# Patient Record
Sex: FEMALE
[De-identification: names, ages, dates, MRNs, and addresses within clinical notes are randomized per-mention and may not be internally consistent; named-entity substitution may affect disease eponyms.]

---

## 2019-08-15 PROBLEM — Z00.00 ENCOUNTER FOR PREVENTIVE HEALTH EXAMINATION: Status: ACTIVE | Noted: 2019-08-15

## 2019-09-11 ENCOUNTER — APPOINTMENT (OUTPATIENT)
Dept: ORTHOPEDIC SURGERY | Facility: CLINIC | Age: 54
End: 2019-09-11
Payer: COMMERCIAL

## 2019-09-11 VITALS — BODY MASS INDEX: 38.57 KG/M2 | HEIGHT: 66 IN | WEIGHT: 240 LBS

## 2019-09-11 DIAGNOSIS — M17.0 BILATERAL PRIMARY OSTEOARTHRITIS OF KNEE: ICD-10-CM

## 2019-09-11 DIAGNOSIS — M72.2 PLANTAR FASCIAL FIBROMATOSIS: ICD-10-CM

## 2019-09-11 PROCEDURE — 73630 X-RAY EXAM OF FOOT: CPT | Mod: LT

## 2019-09-11 PROCEDURE — 99204 OFFICE O/P NEW MOD 45 MIN: CPT

## 2019-09-11 PROCEDURE — 73562 X-RAY EXAM OF KNEE 3: CPT | Mod: 50

## 2019-09-11 RX ORDER — HYLAN G-F 20 16MG/2ML
16 SYRINGE (ML) INTRAARTICULAR
Qty: 6 | Refills: 0 | Status: ACTIVE | OUTPATIENT
Start: 2019-09-11

## 2019-09-11 NOTE — HISTORY OF PRESENT ILLNESS
[de-identified] : C/O DARIN KNEE PAIN - LAST SEEN IN 2017 FOR DARIN SYNVISC. ALSO C/O LEFT HEEL PAIN. REPORTS NO RECENT FALLS OR ACCIDENTS. \par \par SENT FOR NEW XRAYS TODAY.

## 2019-09-11 NOTE — DISCUSSION/SUMMARY
[de-identified] : Patient defers a cortisone injection today of left knee I will place her on Naprosyn 500 mg twice a day combined with Pepcid for 6 days. She will followup once Orthovisc injections are available for review she is done with these in the past. If her plantar fasciitis persists she will be sent for physical therapy

## 2019-09-11 NOTE — PHYSICAL EXAM
[de-identified] : 3 views of left foot were obtained showing no bony abnormalities no obvious spurring of the calcaneus.\par \par AP standing and lateral sunrise views were obtained of both knees show a fairly best tricompartmental osteoarthritis both compartments and patellofemoral compartment both knees more radiographically advanced on the left. [de-identified] : Patient exam today his left heel patient has tenderness in the area of the plantar fascia origin. She has full range of motion of the ankle and digits neurovascular intact distally.\par \par Left knee exam today patient's previous well-healed surgical incisions range of motion is 0-115°. Mild crepitus and warmth. Definite pain with compression of the patellofemoral articulation.\par \par Right knee exam as some mild warmth mild medial joint tenderness range of motion 0-130°. Neurovascular intact distally

## 2019-10-03 ENCOUNTER — OTHER (OUTPATIENT)
Age: 54
End: 2019-10-03

## 2019-10-07 ENCOUNTER — APPOINTMENT (OUTPATIENT)
Dept: ORTHOPEDIC SURGERY | Facility: CLINIC | Age: 54
End: 2019-10-07
Payer: COMMERCIAL

## 2019-10-07 PROCEDURE — 99214 OFFICE O/P EST MOD 30 MIN: CPT | Mod: 25

## 2019-10-07 PROCEDURE — 20611 DRAIN/INJ JOINT/BURSA W/US: CPT | Mod: 50

## 2019-10-07 NOTE — PROCEDURE
[de-identified] : Patient was given the first of the series of 3 bilateral Synvisc injections. Patient tolerated the procedure well. Injections were given under sterile conditions an ultrasound guidance

## 2019-10-07 NOTE — PHYSICAL EXAM
[de-identified] : Patient exam today his left heel patient has tenderness in the area of the plantar fascia origin. She has full range of motion of the ankle and digits neurovascular intact distally.\par \par Left knee exam today patient's previous well-healed surgical incisions range of motion is 0-115°. Mild crepitus and warmth. Definite pain with compression of the patellofemoral articulation.\par \par Right knee exam as some mild warmth mild medial joint tenderness range of motion 0-130°. Neurovascular intact distally

## 2019-10-14 ENCOUNTER — APPOINTMENT (OUTPATIENT)
Dept: ORTHOPEDIC SURGERY | Facility: CLINIC | Age: 54
End: 2019-10-14

## 2019-10-21 ENCOUNTER — APPOINTMENT (OUTPATIENT)
Dept: ORTHOPEDIC SURGERY | Facility: CLINIC | Age: 54
End: 2019-10-21
Payer: COMMERCIAL

## 2019-10-21 PROCEDURE — 20611 DRAIN/INJ JOINT/BURSA W/US: CPT | Mod: 50

## 2019-10-21 PROCEDURE — 99214 OFFICE O/P EST MOD 30 MIN: CPT | Mod: 25

## 2019-10-21 NOTE — PHYSICAL EXAM
[de-identified] : Patient exam today his left heel patient has tenderness in the area of the plantar fascia origin. She has full range of motion of the ankle and digits neurovascular intact distally.\par \par Left knee exam today patient's previous well-healed surgical incisions range of motion is 0-115°. Mild crepitus and warmth. Definite pain with compression of the patellofemoral articulation.\par \par Right knee exam as some mild warmth mild medial joint tenderness range of motion 0-130°. Neurovascular intact distally

## 2019-10-21 NOTE — PROCEDURE
[de-identified] : Patient was given the second of the series of 3 bilateral Synvisc injections. Patient tolerated the procedure well. Injections were given under sterile conditions an ultrasound guidance

## 2019-10-28 ENCOUNTER — APPOINTMENT (OUTPATIENT)
Dept: ORTHOPEDIC SURGERY | Facility: CLINIC | Age: 54
End: 2019-10-28
Payer: COMMERCIAL

## 2019-10-28 PROCEDURE — 99214 OFFICE O/P EST MOD 30 MIN: CPT | Mod: 25

## 2019-10-28 PROCEDURE — 20611 DRAIN/INJ JOINT/BURSA W/US: CPT | Mod: 50

## 2019-10-28 NOTE — PHYSICAL EXAM
[de-identified] : Patient exam today his left heel patient has tenderness in the area of the plantar fascia origin. She has full range of motion of the ankle and digits neurovascular intact distally.\par \par Left knee exam today patient's previous well-healed surgical incisions range of motion is 0-115°. Mild crepitus and warmth. Definite pain with compression of the patellofemoral articulation.\par \par Right knee exam as some mild warmth mild medial joint tenderness range of motion 0-130°. Neurovascular intact distally

## 2019-10-28 NOTE — PROCEDURE
[de-identified] : Patient was given the third of the series of 3 bilateral Synvisc injections. Patient tolerated the procedure well. Injections were given under sterile conditions an ultrasound guidance [facility]

## 2019-11-22 ENCOUNTER — RX RENEWAL (OUTPATIENT)
Age: 54
End: 2019-11-22

## 2019-11-22 RX ORDER — FAMOTIDINE 20 MG/1
20 TABLET, FILM COATED ORAL DAILY
Qty: 90 | Refills: 0 | Status: ACTIVE | COMMUNITY
Start: 2019-11-22 | End: 1900-01-01

## 2019-11-22 RX ORDER — IBUPROFEN 800 MG/1
800 TABLET, FILM COATED ORAL 3 TIMES DAILY
Qty: 90 | Refills: 2 | Status: ACTIVE | COMMUNITY
Start: 2019-11-22 | End: 1900-01-01

## 2020-11-16 ENCOUNTER — APPOINTMENT (OUTPATIENT)
Dept: ORTHOPEDIC SURGERY | Facility: CLINIC | Age: 55
End: 2020-11-16